# Patient Record
Sex: MALE | Race: WHITE
[De-identification: names, ages, dates, MRNs, and addresses within clinical notes are randomized per-mention and may not be internally consistent; named-entity substitution may affect disease eponyms.]

---

## 2018-08-20 ENCOUNTER — HOSPITAL ENCOUNTER (OUTPATIENT)
Dept: HOSPITAL 45 - C.GI | Age: 33
Discharge: HOME | End: 2018-08-20
Attending: INTERNAL MEDICINE
Payer: COMMERCIAL

## 2018-08-20 VITALS
BODY MASS INDEX: 32.78 KG/M2 | WEIGHT: 180.38 LBS | HEIGHT: 62.01 IN | WEIGHT: 180.38 LBS | HEIGHT: 62.01 IN | BODY MASS INDEX: 32.78 KG/M2 | BODY MASS INDEX: 32.78 KG/M2

## 2018-08-20 VITALS — HEART RATE: 73 BPM | SYSTOLIC BLOOD PRESSURE: 136 MMHG | DIASTOLIC BLOOD PRESSURE: 96 MMHG | OXYGEN SATURATION: 100 %

## 2018-08-20 DIAGNOSIS — Z80.0: ICD-10-CM

## 2018-08-20 DIAGNOSIS — I10: ICD-10-CM

## 2018-08-20 DIAGNOSIS — K51.90: Primary | ICD-10-CM

## 2018-08-20 DIAGNOSIS — Z79.899: ICD-10-CM

## 2018-08-20 DIAGNOSIS — K64.8: ICD-10-CM

## 2018-08-20 DIAGNOSIS — Z88.5: ICD-10-CM

## 2018-08-20 DIAGNOSIS — R11.2: ICD-10-CM

## 2018-08-20 DIAGNOSIS — K57.90: ICD-10-CM

## 2018-08-20 NOTE — ENDO HISTORY AND PHYSICAL
History & Physical


Date of Service:


Aug 20, 2018.


Chief Complaint:


ULCERS, COLON POLYPS


Referring Physician:


 MARLIN BULL


History of Present Illness


Patient presenting for follow-up with regard to also otitis, nausea and 

vomiting.  We are planning to do an upper endoscopy today to evaluate his 

history of nausea and vomiting is been ongoing for several months.  We are also 

planning to do a colonoscopy to reevaluate his history of ulcerative proctitis.





Past Medical History


Arthritis, Other





Past Surgical History


Hx Cardiac Surgery:  No


Hx Internal Defibrillator:  No


Hx Pacemaker:  No


Hx Abdominal Surgery:  Yes (OPEN LIVER BX)


Hx of Implantable Prosthesis:  No


Hx Post-Op Nausea and Vomiting:  No


Hx Cancer Surgery:  No


Hx Thoracic Surgery:  No


Hx Orthopedic:  Yes (2 NECK SURGERIES (ABNORMAL ROM))


Hx Urinary Tract Surgery:  No





Family History


Colon CA





Social History


Smoking Status:  Never Smoker


Hx Substance Use:  No


Hx Alcohol Use:  Yes (MINIMAL)





Allergies


Coded Allergies:  


     Codeine (Verified  Allergy, Unknown, RASH, 8/20/18)


     Morphine (Verified  Allergy, Unknown, RASH, 8/20/18)


     Naproxen (Verified  Allergy, Unknown, RASH, 8/20/18)


 EDEMA-OTHER, RASH


     Rofecoxib (Verified  Allergy, Unknown, ANAPHYLAXIS, 8/20/18)


 AIRWAY EDEMA, RASH





Current Medications





Reported Home Medications








 Medications  Dose


 Route/Sig


 Max Daily Dose Days Date Category


 


 Plaquenil


  (Hydroxychloroquine


 Sulfate) 200 Mg


 Tab  400 Mg


 PO HS


    8/15/18 Reported


 


 Os-Jericho 500 Plus D


  (Calcium/Vitamin


 D)  Tab  2 Tab


 PO BID


    8/15/18 Reported


 


 Imitrex


  (Sumatriptan


 Succinate) 50 Mg


 Tab  50 Mg


 PO PRN


    8/15/18 Reported


 


 Amoxil


  (Amoxicillin) 500


 Mg Cap  4 Cap


 PO AS DIRECTED


    8/15/18 Reported


 


 Mesalamine


  (Mesalamine W/


 Cleanser) 4 Gm Kit  1 Dose


 RE HS


    8/15/18 Reported


 


 Zithromax


  (Azithromycin)


 250 Mg Tab  250 Mg


 PO 3XWK


    8/15/18 Reported


 


 Humira Pen


  (Adalimumab) 40


 Mg/0.8 Ml Kit  40 Mg


 SC AS DIRECTED


    8/15/18 Reported


 


 Voltaren 1% Top


 Gel (Diclofenac


 Sodium (Topical))


 1 % Gel  1 Dose


 TD TID


    8/15/18 Reported


 


 Omeprazole Dr


  (Omeprazole) 40


 Mg Cap  1 Cap


 PO DAILY


    8/15/18 Reported


 


 Elavil


  (Amitriptyline


 Hcl) 25 Mg Tab  50 Mg


 PO HS


    8/15/18 Reported


 


 Miralax


  (Polyethylene


 Glycol 3350) 1


 Pow Pow  17 Gm


 PO DAILY


    8/15/18 Reported


 


 Multi For Him


  (Multiple


 Vitamins W/


 Minerals) 1 Tab


 Tab  


 DAILY


    4/30/15 Reported


 


 Benzoyl Peroxide


 5 % Gel  


 


    4/30/15 Reported


 


 Clindamycin


 Phosphate


  (Clindamycin


 Phosphate


  (Topical) 1 % Gel  


 


    4/30/15 Reported











Vital Signs


Weight (Kilograms):  81.82


Height (Feet):  5


Height (Inches):  2











  Date Time  Temp Pulse Resp B/P (MAP) Pulse Ox O2 Delivery O2 Flow Rate FiO2


 


8/20/18 08:48 36.0 81 18 140/102 (115) 98 Room Air  











Physical Exam


General Appearance:  no apparent distress


Respiratory/Chest:  


   Auscultation:  breath sounds normal


Cardiovascular:  


   Heart Auscultation:  II/VI AALIYAH


Abdomen:  


   Inspection & Palpation:  soft


Patient with very poor mobility of the head jaw and neck.





Assessment and Plan


We have discussed the risks and benefits of upper endoscopy and colonoscopy to 

include bleeding, infection, perforation, respiratory problems and emergency 

airway problems.

## 2018-08-20 NOTE — DISCHARGE INSTRUCTIONS
Endoscopy Patient Instructions


Date / Procedure(s) Performed


Aug 20, 2018.


Colonoscopy, EGD





Allergy Information


Coded Allergies:  


     Codeine (Verified  Allergy, Unknown, RASH, 8/20/18)


     Morphine (Verified  Allergy, Unknown, RASH, 8/20/18)


     Naproxen (Verified  Allergy, Unknown, RASH, 8/20/18)


 EDEMA-OTHER, RASH


     Rofecoxib (Verified  Allergy, Unknown, ANAPHYLAXIS, 8/20/18)


 AIRWAY EDEMA, RASH





Discharge Date / Findings


Aug 20, 2018.


Mild gastritis


Normal esophagus


Normal-appearing small intestine


Internal hemorrhoids, colonoscopy otherwise normal





Medication Instructions





Reported Home Medications








 Medications  Dose


 Route/Sig


 Max Daily Dose Days Date Category


 


 Plaquenil


  (Hydroxychloroquine


 Sulfate) 200 Mg


 Tab  400 Mg


 PO HS


    8/15/18 Reported


 


 Os-Jericho 500 Plus D


  (Calcium/Vitamin


 D)  Tab  2 Tab


 PO BID


    8/15/18 Reported


 


 Imitrex


  (Sumatriptan


 Succinate) 50 Mg


 Tab  50 Mg


 PO PRN


    8/15/18 Reported


 


 Amoxil


  (Amoxicillin) 500


 Mg Cap  4 Cap


 PO AS DIRECTED


    8/15/18 Reported


 


 Mesalamine


  (Mesalamine W/


 Cleanser) 4 Gm Kit  1 Dose


 RE HS


    8/15/18 Reported


 


 Zithromax


  (Azithromycin)


 250 Mg Tab  250 Mg


 PO 3XWK


    8/15/18 Reported


 


 Humira Pen


  (Adalimumab) 40


 Mg/0.8 Ml Kit  40 Mg


 SC AS DIRECTED


    8/15/18 Reported


 


 Voltaren 1% Top


 Gel (Diclofenac


 Sodium (Topical))


 1 % Gel  1 Dose


 TD TID


    8/15/18 Reported


 


 Omeprazole Dr


  (Omeprazole) 40


 Mg Cap  1 Cap


 PO DAILY


    8/15/18 Reported


 


 Elavil


  (Amitriptyline


 Hcl) 25 Mg Tab  50 Mg


 PO HS


    8/15/18 Reported


 


 Miralax


  (Polyethylene


 Glycol 3350) 1


 Pow Pow  17 Gm


 PO DAILY


    8/15/18 Reported


 


 Multi For Him


  (Multiple


 Vitamins W/


 Minerals) 1 Tab


 Tab  


 DAILY


    4/30/15 Reported


 


 Benzoyl Peroxide


 5 % Gel  


 


    4/30/15 Reported


 


 Clindamycin


 Phosphate


  (Clindamycin


 Phosphate


  (Topical) 1 % Gel  


 


    4/30/15 Reported











Provider Instructions





Activity Restrictions





-  No exercising or heavy lifting for 24 hours. 


-  Do not drink alcohol the day of the procedure.


-  Do not drive a car or operate machinery until the day after the procedure.


-  Do not make any important decisions or sign important papers in 24 hours 

after the procedure.





Following Day:





-  Return to full activity which may include returning to work/school.





Diet





Start your diet with liquids and light foods (jello, soup, juice, toast).  Then 

eat your usual diet if not nauseated.





Treatment For Common After Affects





For mild abdominal pain, bloating, or excessive gas:





-  Rest


-  Eat lightly


-  Lie on right side





Follow-Up Information


Follow-up with gastroenterology in 6 months or sooner if needed


Await pathology results





Anesthesia Information





What You Should Know





You have had a procedure that required some medicine to reduce anxiety and 

discomfort. This treatment is called moderate sedation.  


After receiving the treatment, you may be sleepy, but you will be able to 

breathe on your own.  The effects of the treatment may last for several hours.








Follow these instructions along with Activity/Diet recommendations noted above:





*  Do NOT do anything where dizziness or clumsiness would be dangerous.





*  Rest quietly at home today, then you can be up and about tomorrow.





*  Have a responsible person stay with you the rest of today.





*  You may have had an I.V. today.  If so, you may take the dressing off later 

today.





Recommendations


 


Call your doctor if:





*  Trouble breathing 





*  Continuous vomiting for more than 24 hours








*  Temperature above 101 degrees





*  Severe abdominal pain or bloating





*  Pain not relieved by pain medicine ordered





*  There is increased drainage or redness from any incision





*  A large amount of rectal bleeding greater than 2-3 tablespoons. 


   (If you had a polyp/s removed or have hemorrhoids, a small amount of blood -


    from the rectum is to be expected.)





*  You have any unanswered questions or concerns.








IN THE EVENT OF A SERIOUS EMERGENCY, GO TO THE NEAREST EMERGENCY ROOM








       Your discharge instructions were prepared by provider Tyler Palacios.





 Patient Instructions Signature Page














Carmen Louis 











Patient (or Guardian) Signature/Date:____________________________________ I 

have read and understand the instructions given to me by my caregivers.








Caregiver/RN/Doctor Signature/Date:____________________________________











The above-named patient and/or guardian has received patient instructions on 

this date.





























+  Original Patient Signature Page (only) stays with chart.  Please make copy 

for patient.

## 2018-08-20 NOTE — ANESTHESIOLOGY PROGRESS NOTE
Anesthesia Post Op Note


Date & Time


Aug 20, 2018 at 10:07





Vital Signs


Pain Intensity:  0





Vital Signs Past 12 Hours








  Date Time  Temp Pulse Resp B/P (MAP) Pulse Ox O2 Delivery O2 Flow Rate FiO2


 


8/20/18 09:57  83 16 146/108 (121) 99 Room Air  


 


8/20/18 08:48 36.0 81 18 140/102 (115) 98 Room Air  











Notes


Mental Status:  alert / awake / arousable, participated in evaluation


Pt Amnestic to Procedure:  Yes


Nausea / Vomiting:  adequately controlled


Pain:  adequately controlled


Airway Patency, RR, SpO2:  stable & adequate


BP & HR:  stable & adequate


Hydration State:  stable & adequate


Anesthetic Complications:  no major complications apparent

## 2018-08-20 NOTE — GI REPORT
Patient Name: Carmen Louis

Procedure Date: 8/20/2018 8:59 AM

MRN: [id number]

Account Number: [id number]

YOB: 1985

Admit Type: Outpatient

Age: 32

Gender: Male

Attending MD: Tyler Palacios DO

Procedure:            Colonoscopy

Providers:            Tyler Palacios DO

Referring MD:         Daylin Cohen

Indications:          High risk colon cancer surveillance: Ulcerative 

                      proctitis

Medicines:            Monitored Anesthesia Care

Complications:        No immediate complications. Estimated blood loss: 

                      Minimal.

Estimated Blood Loss: Estimated blood loss was minimal.

Procedure:            Pre-Anesthesia Assessment:

                      - Prior to the procedure, a History and Physical was 

                      performed, and patient medications, allergies and 

                      sensitivities were reviewed. The patient's tolerance of 

                      previous anesthesia was reviewed.

                      - The risks and benefits of the procedure and the 

                      sedation options and risks were discussed with the 

                      patient. All questions were answered and informed 

                      consent was obtained.

                      - Patient identification and proposed procedure were 

                      verified prior to the procedure by the physician, the 

                      nurse and the anesthetist. The procedure was verified 

                      in the procedure room.

                      - Pre-procedure physical examination revealed no 

                      contraindications to sedation.

                      - ASA Grade Assessment: III - A patient with severe 

                      systemic disease.

                      - After reviewing the risks and benefits, the patient 

                      was deemed in satisfactory condition to undergo the 

                      procedure.

                      - The anesthesia plan was to use monitored anesthesia 

                      care (MAC).

                      - Immediately prior to administration of medications, 

                      the patient was re-assessed for adequacy to receive 

                      sedatives.

                      - The heart rate, respiratory rate, oxygen saturations, 

                      blood pressure, adequacy of pulmonary ventilation, and 

                      response to care were monitored throughout the 

                      procedure.

                      - The physical status of the patient was re-assessed 

                      after the procedure.

                      After I obtained informed consent, the scope was passed 

                      under direct vision. Throughout the procedure, the 

                      patient's blood pressure, pulse, and oxygen saturations 

                      were monitored continuously. The scope was introduced 

                      through the anus and advanced to the terminal ileum. 

                      The colonoscopy was performed without difficulty. The 

                      patient tolerated the procedure well. The quality of 

                      the bowel preparation was good.

Findings:

     The perianal and digital rectal examinations were normal. Pertinent 

     negatives include normal sphincter tone.

     The terminal ileum appeared normal.

     Internal hemorrhoids were found during retroflexion. The hemorrhoids 

     were mild.

     A few small-mouthed diverticula were found in the sigmoid colon and 

     descending colon.

     Normal mucosa was found in the entire colon. Biopsies were taken with a 

     cold forceps from the entire colon for ulcerative colitis surveillance. 

     These biopsy specimens from the right colon, left colon, transverse 

     colon and rectum were sent to Pathology. Estimated blood loss was 

     minimal.

     The exam was otherwise without abnormality.

Impression:           - The examined portion of the ileum was normal.

                      - Internal hemorrhoids.

                      - Normal mucosa in the entire examined colon. Biopsied.

                      - Mild left sided diverticulosis.

                      - The examination was otherwise normal.

Recommendation:       - Discharge patient to home (ambulatory).

                      - Advance diet as tolerated today.

                      - Await pathology results.

                      - Repeat colonoscopy in 5 years for surveillance.

                      - Return to GI office in 6 months.

Tyler Palacios D.O.

Tyler Palacios, 

8/20/2018 10:08:23 AM

This report has been signed electronically.

Note Initiated On: 8/20/2018 8:59 AM

Number of Addenda: 0

     I attest to the content of the Intraoperative Record and orders 

     documented therein, exceptions below



{[id number]}

## 2018-08-20 NOTE — GI REPORT
Patient Name: Carmen Louis

Procedure Date: 8/20/2018 8:59 AM

MRN: [id number]

Account Number: [id number]

YOB: 1985

Admit Type: Outpatient

Age: 32

Gender: Male

Attending MD: Tyler Palacios DO

Procedure:            Upper GI endoscopy

Providers:            Tyler Palacios DO

Referring MD:         Daylin Cohen

Indications:          Nausea with vomiting

Medicines:            Monitored Anesthesia Care

Complications:        No immediate complications. Estimated blood loss: 

                      Minimal.

Estimated Blood Loss: Estimated blood loss was minimal.

Procedure:            Pre-Anesthesia Assessment:

                      - Prior to the procedure, a History and Physical was 

                      performed, and patient medications, allergies and 

                      sensitivities were reviewed. The patient's tolerance of 

                      previous anesthesia was reviewed.

                      - The risks and benefits of the procedure and the 

                      sedation options and risks were discussed with the 

                      patient. All questions were answered and informed 

                      consent was obtained.

                      - Patient identification and proposed procedure were 

                      verified prior to the procedure by the physician, the 

                      nurse and the anesthetist. The procedure was verified 

                      in the procedure room.

                      - Pre-procedure physical examination revealed no 

                      contraindications to sedation.

                      - ASA Grade Assessment: II - A patient with mild 

                      systemic disease.

                      - After reviewing the risks and benefits, the patient 

                      was deemed in satisfactory condition to undergo the 

                      procedure.

                      - The anesthesia plan was to use monitored anesthesia 

                      care (MAC).

                      - Immediately prior to administration of medications, 

                      the patient was re-assessed for adequacy to receive 

                      sedatives.

                      - The heart rate, respiratory rate, oxygen saturations, 

                      blood pressure, adequacy of pulmonary ventilation, and 

                      response to care were monitored throughout the 

                      procedure.

                      - The physical status of the patient was re-assessed 

                      after the procedure.

                      After obtaining informed consent, the endoscope was 

                      passed under direct vision. Throughout the procedure, 

                      the patient's blood pressure, pulse, and oxygen 

                      saturations were monitored continuously. The Endoscope 

                      was introduced through the mouth, and advanced to the 

                      third part of duodenum. The upper GI endoscopy was 

                      accomplished without difficulty. The patient tolerated 

                      the procedure well.

Findings:

     The examined esophagus was normal.

     The Z-line was regular and was found 35 cm from the incisors.

     Diffuse mild inflammation characterized by erythema and granularity was 

     found in the entire examined stomach. Biopsies were taken with a cold 

     forceps for histology. Estimated blood loss was minimal.

     The examined duodenum was normal. Biopsies were taken with a cold 

     forceps for histology. Estimated blood loss was minimal.

Impression:           - Normal esophagus.

                      - Z-line regular, 35 cm from the incisors.

                      - Mild antral gastritis. Biopsied.

                      - Normal examined duodenum. Biopsied.

Recommendation:       - Perform a colonoscopy today.

                      - Await pathology results.

Tyler Palacios D.O.

Tyler Palacios, 

8/20/2018 10:02:54 AM

This report has been signed electronically.

Note Initiated On: 8/20/2018 8:59 AM

Number of Addenda: 0

     I attest to the content of the Intraoperative Record and orders 

     documented therein, exceptions below



{[id number]}